# Patient Record
Sex: MALE | Race: WHITE | NOT HISPANIC OR LATINO | Employment: OTHER | URBAN - NONMETROPOLITAN AREA
[De-identification: names, ages, dates, MRNs, and addresses within clinical notes are randomized per-mention and may not be internally consistent; named-entity substitution may affect disease eponyms.]

---

## 2017-04-12 ENCOUNTER — OFFICE VISIT (OUTPATIENT)
Dept: ORTHOPEDIC SURGERY | Facility: CLINIC | Age: 82
End: 2017-04-12

## 2017-04-12 VITALS — BODY MASS INDEX: 28.84 KG/M2 | WEIGHT: 206 LBS | HEIGHT: 71 IN

## 2017-04-12 DIAGNOSIS — M51.36 DEGENERATIVE DISC DISEASE, LUMBAR: Primary | ICD-10-CM

## 2017-04-12 PROCEDURE — 99213 OFFICE O/P EST LOW 20 MIN: CPT | Performed by: ORTHOPAEDIC SURGERY

## 2017-04-12 RX ORDER — NABUMETONE 750 MG/1
TABLET, FILM COATED ORAL
Qty: 60 TABLET | Refills: 5 | Status: SHIPPED | OUTPATIENT
Start: 2017-04-12 | End: 2017-11-17 | Stop reason: SDUPTHER

## 2017-04-12 RX ORDER — OMEPRAZOLE 20 MG/1
CAPSULE, DELAYED RELEASE ORAL
Qty: 30 CAPSULE | Refills: 5 | Status: SHIPPED | OUTPATIENT
Start: 2017-04-12 | End: 2017-11-17 | Stop reason: SDUPTHER

## 2017-04-12 RX ORDER — OMEPRAZOLE 20 MG/1
20 CAPSULE, DELAYED RELEASE ORAL DAILY
Qty: 30 CAPSULE | Refills: 3 | Status: SHIPPED | OUTPATIENT
Start: 2017-04-12 | End: 2017-10-12 | Stop reason: SDUPTHER

## 2017-04-12 RX ORDER — NABUMETONE 750 MG/1
750 TABLET, FILM COATED ORAL 2 TIMES DAILY
Qty: 60 TABLET | Refills: 3 | Status: SHIPPED | OUTPATIENT
Start: 2017-04-12 | End: 2017-10-12 | Stop reason: SDUPTHER

## 2017-04-12 NOTE — PROGRESS NOTES
Subjective   Dakota Burrows is a 81 y.o. male.     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   He was disabled from that point forward, states he never did return to work.  He has been taking relafen for a long period of time since the time point when he was injured.        The following portions of the patient's history were reviewed and updated as appropriate:   He  has no past medical history on file.  He  does not have any pertinent problems on file.  He  has no past surgical history on file.  His family history is not on file.  He  has no tobacco, alcohol, and drug history on file.       Current Outpatient Prescriptions   Medication Sig Dispense Refill   • aspirin 81 MG EC tablet Take 81 mg by mouth Daily.     • nabumetone (RELAFEN) 750 MG tablet   0   • nabumetone (RELAFEN) 750 MG tablet Take 2 tablets by mouth once daily 60 tablet 5   • omeprazole (priLOSEC) 20 MG capsule take 1 capsule by mouth once daily  0   • valsartan (DIOVAN) 160 MG tablet Take 160 mg by mouth Daily.       No current facility-administered medications for this visit.      Current Outpatient Prescriptions on File Prior to Visit   Medication Sig   • aspirin 81 MG EC tablet Take 81 mg by mouth Daily.   • nabumetone (RELAFEN) 750 MG tablet    • omeprazole (priLOSEC) 20 MG capsule take 1 capsule by mouth once daily   • valsartan (DIOVAN) 160 MG tablet Take 160 mg by mouth Daily.   • [DISCONTINUED] nabumetone (RELAFEN) 750 MG tablet take 2 tablets by mouth once daily     No current facility-administered medications on file prior to visit.      He has No Known Allergies..    Review of Systems   Review of Systems - History obtained from the patient  General ROS: negative  Psychological ROS: negative  Ophthalmic ROS: negative  ENT ROS: negative  Allergy and Immunology ROS: negative  Hematological and Lymphatic ROS: negative  Endocrine ROS: negative  Breast ROS:  negative  Respiratory ROS: no cough, shortness of breath, or wheezing  Cardiovascular ROS: negative for - chest pain, dyspnea on exertion or edema  Gastrointestinal ROS: positive for - heartburn  Genito-Urinary ROS: no dysuria, trouble voiding, or hematuria  Musculoskeletal ROS: positive for - pain in back - generalized  Neurological ROS: no TIA or stroke symptoms      Objective   Physical Exam   There were no vitals taken for this visit.   There were no vitals filed for this visit.      General Appearance:  Alert, cooperative, no distress, appears stated age   Head:  Normocephalic, without obvious abnormality, atraumatic   Eyes:  PERRL, conjunctiva/corneas clear, EOM's intact, fundi benign, both eyes   Ears:  Normal TM's and external ear canals, both ears   Nose: Nares normal, septum midline, mucosa normal, no drainage or sinus tenderness   Throat: Lips, mucosa, and tongue normal; teeth and gums normal   Neck: Supple, symmetrical, trachea midline, no adenopathy, thyroid: not enlarged, symmetric, no tenderness/mass/nodules, no carotid bruit or JVD   Back:   Symmetric, no curvature, ROM normal, no CVA tenderness,  Lumbar incision, healed.   Lungs:   Clear to auscultation bilaterally, respirations unlabored   Chest Wall:  No tenderness or deformity   Heart:  Regular rate and rhythm, S1, S2 normal, no murmur, rub or gallop   Abdomen:   Soft, non-tender, bowel sounds active all four quadrants,  no masses, no organomegaly   Genitalia:  Normal male   Rectal:  Normal tone, normal prostate, no masses or tenderness;  guaiac negative stool   Extremities: Extremities normal, atraumatic, no cyanosis or edema   Pulses: 2+ and symmetric   Skin: Skin color, texture, turgor normal, no rashes or lesions   Lymph nodes: Cervical, supraclavicular, and axillary nodes normal   Neurologic: Normal         Assessment/Plan   Dakota was seen today for follow-up.    Diagnoses and all orders for this visit:    Degenerative disc disease,  lumbar    Other orders  -     nabumetone (RELAFEN) 750 MG tablet; Take 2 tablets by mouth once daily      Refill relafen and omeprazole.  Discussed watching for esophageal reflux.

## 2017-04-12 NOTE — PROGRESS NOTES
Dakota Burrows is a 81 y.o. male returns for     Chief Complaint   Patient presents with   • Lumbar Spine - Follow-up       HISTORY OF PRESENT ILLNESS: Patient here for med refill.       CONCURRENT MEDICAL HISTORY:    No past medical history on file.    No Known Allergies      Current Outpatient Prescriptions:   •  aspirin 81 MG EC tablet, Take 81 mg by mouth Daily., Disp: , Rfl:   •  nabumetone (RELAFEN) 750 MG tablet, , Disp: , Rfl: 0  •  nabumetone (RELAFEN) 750 MG tablet, Take 2 tablets by mouth once daily, Disp: 60 tablet, Rfl: 5  •  omeprazole (priLOSEC) 20 MG capsule, take 1 capsule by mouth once daily, Disp: , Rfl: 0  •  valsartan (DIOVAN) 160 MG tablet, Take 160 mg by mouth Daily., Disp: , Rfl:     No past surgical history on file.    ROS  No fevers or chills.  No chest pain or shortness of air.  No GI or  disturbances.    PHYSICAL EXAMINATION:       There were no vitals taken for this visit.    Physical Exam    GAIT:     []  Normal  []  Antalgic    Assistive device: []  None  []  Walker     []  Crutches  []  Cane     []  Wheelchair  []  Stretcher    Ortho Exam      No results found.          ASSESSMENT:    Diagnoses and all orders for this visit:    Degenerative disc disease, lumbar    Other orders  -     nabumetone (RELAFEN) 750 MG tablet; Take 2 tablets by mouth once daily          PLAN    No Follow-up on file.    Nathalie Loza MA

## 2017-10-11 NOTE — TELEPHONE ENCOUNTER
HAS A APT FOR THIS Friday HAS TO CANCEL BECAUSE HIS CANCER TREATMENTS ARE MAKING HIM REALLY SICK AND TIRED. BUT HE NEEDS HIS REFILLS SENT TO [facility], NABUMETONE 750MG

## 2017-10-13 RX ORDER — NABUMETONE 750 MG/1
750 TABLET, FILM COATED ORAL 2 TIMES DAILY
Qty: 60 TABLET | Refills: 3 | Status: SHIPPED | OUTPATIENT
Start: 2017-10-13

## 2017-10-13 RX ORDER — OMEPRAZOLE 20 MG/1
20 CAPSULE, DELAYED RELEASE ORAL DAILY
Qty: 30 CAPSULE | Refills: 3 | Status: SHIPPED | OUTPATIENT
Start: 2017-10-13

## 2017-11-17 ENCOUNTER — OFFICE VISIT (OUTPATIENT)
Dept: ORTHOPEDIC SURGERY | Facility: CLINIC | Age: 82
End: 2017-11-17

## 2017-11-17 DIAGNOSIS — M51.36 DEGENERATIVE DISC DISEASE, LUMBAR: Primary | ICD-10-CM

## 2017-11-17 PROBLEM — G89.29 CHRONIC LEFT HIP PAIN: Status: ACTIVE | Noted: 2017-11-17

## 2017-11-17 PROBLEM — M25.552 CHRONIC LEFT HIP PAIN: Status: ACTIVE | Noted: 2017-11-17

## 2017-11-17 PROCEDURE — 99214 OFFICE O/P EST MOD 30 MIN: CPT | Performed by: ORTHOPAEDIC SURGERY

## 2017-11-17 RX ORDER — OMEPRAZOLE 20 MG/1
20 CAPSULE, DELAYED RELEASE ORAL DAILY
Qty: 30 CAPSULE | Refills: 5 | Status: SHIPPED | OUTPATIENT
Start: 2017-11-17

## 2017-11-17 RX ORDER — NABUMETONE 750 MG/1
TABLET, FILM COATED ORAL
Qty: 60 TABLET | Refills: 5 | Status: SHIPPED | OUTPATIENT
Start: 2017-11-17

## 2017-11-17 NOTE — PROGRESS NOTES
Dakota Burrows is a 82 y.o. male returns for     Chief Complaint   Patient presents with   • Lumbar Spine - Follow-up, Pain       HISTORY OF PRESENT ILLNESS: 6 month recheck low back Pain scale today 6/10 patient complains about right hip pain for about a 1 month    Complains of left side buttock and leg pain.  Pain has been present for 1 month.  Pain is sharp, when walking, alleviated by lying down.  Pain is intermittent.  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  Presently has a feeding tube.      CONCURRENT MEDICAL HISTORY:    Past Medical History:   Diagnosis Date   • Pharyngeal cancer        Allergies   Allergen Reactions   • Cyclobenzaprine          Current Outpatient Prescriptions:   •  aspirin 81 MG EC tablet, Take 81 mg by mouth Daily., Disp: , Rfl:   •  nabumetone (RELAFEN) 750 MG tablet, Take 2 tablets by mouth once daily, Disp: 60 tablet, Rfl: 5  •  nabumetone (RELAFEN) 750 MG tablet, Take 1 tablet by mouth 2 (Two) Times a Day., Disp: 60 tablet, Rfl: 3  •  omeprazole (priLOSEC) 20 MG capsule, take 1 capsule by mouth once daily, Disp: 30 capsule, Rfl: 5  •  omeprazole (priLOSEC) 20 MG capsule, Take 1 capsule by mouth Daily., Disp: 30 capsule, Rfl: 3  •  valsartan (DIOVAN) 160 MG tablet, Take 160 mg by mouth Daily., Disp: , Rfl:     History reviewed. No pertinent surgical history.    ROS  No fevers or chills.  No chest pain or shortness of air.  No GI or  disturbances.    PHYSICAL EXAMINATION:       There were no vitals taken for this visit.    Physical Exam   Constitutional: He is oriented to person, place, and time. He appears well-developed and well-nourished.   Thin, elderly.   HENT:   Head: Normocephalic.   Eyes: Pupils are equal, round, and reactive to light. No scleral icterus.   Neck: No JVD present. No tracheal deviation present.   Cardiovascular: Normal rate and intact distal pulses.    Pulmonary/Chest: Effort normal. He has no  wheezes. He has no rales. He exhibits no tenderness.   Abdominal: Soft. He exhibits no mass. There is no tenderness. No hernia.   Lymphadenopathy:     He has no cervical adenopathy.   Neurological: He is alert and oriented to person, place, and time.   Skin: Skin is warm and dry. No erythema. No pallor.   Psychiatric: He has a normal mood and affect. His behavior is normal. Thought content normal.       GAIT:     []  Normal  [x]  Antalgic    Assistive device: []  None  []  Walker     []  Crutches  [x]  Cane     []  Wheelchair  []  Stretcher    Back Exam     Tenderness   The patient is experiencing tenderness in the lumbar.    Range of Motion   Extension: 10   Flexion: 50   Lateral Bend Right: 10   Lateral Bend Left: 10   Rotation Right: 10   Rotation Left: 10     Tests   Straight leg raise right: negative  Straight leg raise left: negative    Other   Erythema: no back redness  Scars: present    Comments:  Lumbar incision in the midline.              No results found.    xrays and MRI are not available for review.      ASSESSMENT:    Diagnoses and all orders for this visit:    Degenerative disc disease, lumbar  -     XR Hip With or Without Pelvis 2 - 3 View Left; Future  -     MRI Lumbar Spine With & Without Contrast; Future          PLAN  Prescription is refilled for prilosec and relafen.  Recommend MRI of lumbar spine  Xray lumbar spine with L5-S1 spot view, xray left hip.      Nathan Lou MD

## 2018-01-01 RX ORDER — NABUMETONE 750 MG/1
TABLET, FILM COATED ORAL
Qty: 60 TABLET | Refills: 0 | Status: SHIPPED | OUTPATIENT
Start: 2018-01-01